# Patient Record
Sex: FEMALE | URBAN - METROPOLITAN AREA
[De-identification: names, ages, dates, MRNs, and addresses within clinical notes are randomized per-mention and may not be internally consistent; named-entity substitution may affect disease eponyms.]

---

## 2021-12-10 ENCOUNTER — OFFICE VISIT (OUTPATIENT)
Dept: URBAN - METROPOLITAN AREA CLINIC 72 | Facility: CLINIC | Age: 56
End: 2021-12-10
Payer: COMMERCIAL

## 2021-12-10 DIAGNOSIS — H16.142 PUNCTATE KERATITIS, LEFT EYE: Primary | ICD-10-CM

## 2021-12-10 PROCEDURE — 99202 OFFICE O/P NEW SF 15 MIN: CPT | Performed by: OPTOMETRIST

## 2021-12-10 RX ORDER — NEOMYCIN SULFATE, POLYMYXIN B SULFATE AND DEXAMETHASONE 3.5; 10000; 1 MG/ML; [USP'U]/ML; MG/ML
SUSPENSION/ DROPS OPHTHALMIC
Qty: 1 | Refills: 0 | Status: ACTIVE
Start: 2021-12-10

## 2021-12-10 ASSESSMENT — INTRAOCULAR PRESSURE
OD: 14
OS: 14

## 2021-12-10 NOTE — IMPRESSION/PLAN
Impression: Punctate keratitis, left eye: H16.142. Plan: Discussed diagnosis in detail with patient. Discussed treatment options with patient. Will continue to observe condition and or symptoms. Reassured patient of current condition and treatment. 

Recommend pt to start Dionte/poly/dex gtts 1 gtt BID OS x 5 days then D/c